# Patient Record
Sex: FEMALE | Race: BLACK OR AFRICAN AMERICAN | Employment: FULL TIME | URBAN - METROPOLITAN AREA
[De-identification: names, ages, dates, MRNs, and addresses within clinical notes are randomized per-mention and may not be internally consistent; named-entity substitution may affect disease eponyms.]

---

## 2017-01-05 ENCOUNTER — APPOINTMENT (OUTPATIENT)
Dept: GENERAL RADIOLOGY | Age: 27
End: 2017-01-05
Attending: PHYSICIAN ASSISTANT
Payer: COMMERCIAL

## 2017-01-05 ENCOUNTER — HOSPITAL ENCOUNTER (EMERGENCY)
Age: 27
Discharge: HOME | End: 2017-01-05
Attending: EMERGENCY MEDICINE
Payer: COMMERCIAL

## 2017-01-05 VITALS
OXYGEN SATURATION: 100 % | SYSTOLIC BLOOD PRESSURE: 129 MMHG | BODY MASS INDEX: 27.94 KG/M2 | RESPIRATION RATE: 15 BRPM | WEIGHT: 178 LBS | HEIGHT: 67 IN | TEMPERATURE: 97.8 F | DIASTOLIC BLOOD PRESSURE: 85 MMHG | HEART RATE: 75 BPM

## 2017-01-05 DIAGNOSIS — R00.2 PALPITATIONS: Primary | ICD-10-CM

## 2017-01-05 LAB
ALBUMIN SERPL BCP-MCNC: 4.3 G/DL (ref 3.4–5)
ALBUMIN/GLOB SERPL: 1.2 {RATIO} (ref 0.8–1.7)
ALP SERPL-CCNC: 82 U/L (ref 45–117)
ALT SERPL-CCNC: 22 U/L (ref 13–56)
ANION GAP BLD CALC-SCNC: 11 MMOL/L (ref 3–18)
AST SERPL W P-5'-P-CCNC: 14 U/L (ref 15–37)
BASOPHILS # BLD AUTO: 0 K/UL (ref 0–0.06)
BASOPHILS # BLD: 0 % (ref 0–2)
BILIRUB SERPL-MCNC: 0.3 MG/DL (ref 0.2–1)
BUN SERPL-MCNC: 9 MG/DL (ref 7–18)
BUN/CREAT SERPL: 12 (ref 12–20)
CALCIUM SERPL-MCNC: 8.9 MG/DL (ref 8.5–10.1)
CHLORIDE SERPL-SCNC: 106 MMOL/L (ref 100–108)
CO2 SERPL-SCNC: 25 MMOL/L (ref 21–32)
CREAT SERPL-MCNC: 0.74 MG/DL (ref 0.6–1.3)
DIFFERENTIAL METHOD BLD: ABNORMAL
EOSINOPHIL # BLD: 0.1 K/UL (ref 0–0.4)
EOSINOPHIL NFR BLD: 1 % (ref 0–5)
ERYTHROCYTE [DISTWIDTH] IN BLOOD BY AUTOMATED COUNT: 14.3 % (ref 11.6–14.5)
GLOBULIN SER CALC-MCNC: 3.5 G/DL (ref 2–4)
GLUCOSE SERPL-MCNC: 87 MG/DL (ref 74–99)
HCG SERPL QL: NEGATIVE
HCG SERPL-ACNC: <1 MIU/ML (ref 1–6)
HCT VFR BLD AUTO: 41.1 % (ref 35–45)
HGB BLD-MCNC: 13.1 G/DL (ref 12–16)
LYMPHOCYTES # BLD AUTO: 43 % (ref 21–52)
LYMPHOCYTES # BLD: 4.4 K/UL (ref 0.9–3.6)
MCH RBC QN AUTO: 25.9 PG (ref 24–34)
MCHC RBC AUTO-ENTMCNC: 31.9 G/DL (ref 31–37)
MCV RBC AUTO: 81.2 FL (ref 74–97)
MONOCYTES # BLD: 0.7 K/UL (ref 0.05–1.2)
MONOCYTES NFR BLD AUTO: 7 % (ref 3–10)
NEUTS SEG # BLD: 5 K/UL (ref 1.8–8)
NEUTS SEG NFR BLD AUTO: 49 % (ref 40–73)
PLATELET # BLD AUTO: 254 K/UL (ref 135–420)
PMV BLD AUTO: 10.5 FL (ref 9.2–11.8)
POTASSIUM SERPL-SCNC: 3.8 MMOL/L (ref 3.5–5.5)
PROT SERPL-MCNC: 7.8 G/DL (ref 6.4–8.2)
RBC # BLD AUTO: 5.06 M/UL (ref 4.2–5.3)
SODIUM SERPL-SCNC: 142 MMOL/L (ref 136–145)
WBC # BLD AUTO: 10.3 K/UL (ref 4.6–13.2)

## 2017-01-05 PROCEDURE — 80053 COMPREHEN METABOLIC PANEL: CPT | Performed by: PHYSICIAN ASSISTANT

## 2017-01-05 PROCEDURE — 85025 COMPLETE CBC W/AUTO DIFF WBC: CPT | Performed by: PHYSICIAN ASSISTANT

## 2017-01-05 PROCEDURE — 84703 CHORIONIC GONADOTROPIN ASSAY: CPT | Performed by: PHYSICIAN ASSISTANT

## 2017-01-05 PROCEDURE — 84702 CHORIONIC GONADOTROPIN TEST: CPT | Performed by: PHYSICIAN ASSISTANT

## 2017-01-05 PROCEDURE — 93005 ELECTROCARDIOGRAM TRACING: CPT

## 2017-01-05 PROCEDURE — 71020 XR CHEST PA LAT: CPT

## 2017-01-05 PROCEDURE — 99284 EMERGENCY DEPT VISIT MOD MDM: CPT

## 2017-01-05 NOTE — Clinical Note
Follow up with your primary care physician. Return to the emergency room with any new or worsening conditions.

## 2017-01-05 NOTE — ED PROVIDER NOTES
HPI Comments: 6:17 PM. Bryant Lechuga is a 27 y.o. female with a history of asthma, GERD, HTN, anemia, and UTI who presents to the ED c/o intermittent palpitations, which started approximately 1.5 weeks ago. The patient reports feeling that her [place] Crutch is racing. \" The patient states that her symptoms keep her up at night. The patient notes that she has been feeling her heart racing \"almost every day. \" The patient states that she has never had these symptoms before. The patient notes that she does not consume a lot of Caffeine. The patient states that she has been taking Prednisone and Doxycyclin for bronchitis for approximately 1 week. The patient states that she is unaware of when she last had her menstrual period due to the Depo-Provera. The patient denies appetite changes, fever, or urinary symptoms. The patient admits to occasional drinking, but denies smoking or drug use. There are no other concerns at this time. The history is provided by the patient. Past Medical History:   Diagnosis Date    Asthma     GERD (gastroesophageal reflux disease)     H/O sinusitis     Hx: UTI (urinary tract infection)     Hypertension        Past Surgical History:   Procedure Laterality Date    Hx gyn  2010         Family History:   Problem Relation Age of Onset    Hypertension Mother     Asthma Mother     Hypertension Maternal Grandmother        Social History     Social History    Marital status: SINGLE     Spouse name: N/A    Number of children: N/A    Years of education: N/A     Occupational History    Not on file.      Social History Main Topics    Smoking status: Never Smoker    Smokeless tobacco: Never Used    Alcohol use Yes      Comment: 1 a day    Drug use: No    Sexual activity: Yes     Partners: Male     Birth control/ protection: Injection     Other Topics Concern    Not on file     Social History Narrative         ALLERGIES: Diflucan [fluconazole] and Penicillins    Review of Systems   Constitutional: Negative for appetite change, chills, fatigue and fever. HENT: Negative for congestion, rhinorrhea and sore throat. Respiratory: Negative for cough and shortness of breath. Cardiovascular: Positive for palpitations (intermittent). Negative for chest pain. Gastrointestinal: Negative for abdominal pain, diarrhea, nausea and vomiting. Genitourinary: Negative for difficulty urinating, dysuria, hematuria and urgency. Musculoskeletal: Negative for myalgias. Skin: Negative for rash and wound. Neurological: Negative for dizziness and headaches. All other systems reviewed and are negative. Vitals:    01/05/17 1741   BP: (!) 135/93   Pulse: 84   Resp: 14   Temp: 97.9 °F (36.6 °C)   SpO2: 100%   Weight: 80.7 kg (178 lb)   Height: 5' 7\" (1.702 m)            Physical Exam   Constitutional: She appears well-developed and well-nourished. Non-toxic appearance. She does not have a sickly appearance. She does not appear ill. No distress. HENT:   Head: Normocephalic and atraumatic. Mouth/Throat: Oropharynx is clear and moist. No oropharyngeal exudate. Eyes: Conjunctivae and EOM are normal. Pupils are equal, round, and reactive to light. No scleral icterus. Neck: Normal range of motion. Neck supple. No hepatojugular reflux and no JVD present. No tracheal deviation present. No thyromegaly present. Cardiovascular: Normal rate, regular rhythm, S1 normal, S2 normal, normal heart sounds, intact distal pulses and normal pulses. Exam reveals no gallop, no S3 and no S4. No murmur heard. Pulses:       Radial pulses are 2+ on the right side, and 2+ on the left side. Dorsalis pedis pulses are 2+ on the right side, and 2+ on the left side. Pulmonary/Chest: Effort normal and breath sounds normal. No respiratory distress. She has no decreased breath sounds. She has no wheezes. She has no rhonchi. She has no rales. Abdominal: Soft.  Normal appearance and bowel sounds are normal. She exhibits no distension and no mass. There is no hepatosplenomegaly. There is no tenderness. There is no rigidity, no rebound, no guarding, no CVA tenderness, no tenderness at McBurney's point and negative Ruff's sign. Musculoskeletal: Normal range of motion. Lymphadenopathy:        Head (right side): No submental, no submandibular, no preauricular and no occipital adenopathy present. Head (left side): No submental, no submandibular, no preauricular and no occipital adenopathy present. She has no cervical adenopathy. Right: No supraclavicular adenopathy present. Left: No supraclavicular adenopathy present. Neurological: She is alert. She has normal strength and normal reflexes. She is not disoriented. No cranial nerve deficit or sensory deficit. Coordination and gait normal. GCS eye subscore is 4. GCS verbal subscore is 5. GCS motor subscore is 6. Skin: Skin is warm, dry and intact. No rash noted. She is not diaphoretic. Psychiatric: She has a normal mood and affect. Her speech is normal and behavior is normal. Judgment and thought content normal. Cognition and memory are normal.   Nursing note and vitals reviewed. MDM  Number of Diagnoses or Management Options  Palpitations:   Diagnosis management comments: Palpitation   Anemia  Infection   Hyperthyroid  Drug induced  Asthma exacerbation         ED Course       Procedures       Labs essentially normal. Pregnancy test is negative. Chest Xray shows no acute process. EKG showed NSR with rate of 83 bpm. With no ST elevations or depression and non specific T wave changes. 7:17 PM 1/5/2017      I have reassessed the patient. Patient is feeling better. Patient was discharged in stable condition. Patient is to return to emergency department if any new or worsening condition.       Scribe Attestation:   Jeni Muhammad am scribing for and in the presence of [place] DO Ramonita January 05, 2017 at 6:27 PM Signed by: Elvia Vidal, 01/05/17, 6:27 PM     Physician Attestation:   I personally performed the services described in this documentation, reviewed and edited the documentation which was dictated to the scribe in my presence, and it accurately records my words and actions.  Gabriela Eaton DO  January 05, 2017 at 7:18 PM

## 2017-01-06 NOTE — TELEPHONE ENCOUNTER
Patient called in and stated that she sent a [employer] request for a prescription refill for the medication Nexium. Patient stated that she has not heard anything back and wanted to know if this could be done today. Please call patient when medication is sent to the pharmacy.

## 2017-01-06 NOTE — TELEPHONE ENCOUNTER
LM for Pt that it was sent on 1/3/16 to her pharmacy but was discontinued by the ED yesterday as not a current medication. Recommend Pt check with her pharmacy.

## 2017-01-06 NOTE — ED NOTES
I have reviewed discharge instructions with the patient. The patient verbalized understanding. Patient armband removed and shredded  Patient acknowledged and signed that all discharge paper documents released were all reviewed and checked under her name. Current Discharge Medication List      CONTINUE these medications which have NOT CHANGED    Details   Cetirizine (ZYRTEC) 10 mg cap Take  by mouth.      medroxyPROGESTERone (DEPO-PROVERA) 150 mg/mL syrg 150 mg by IntraMUSCular route once. montelukast (SINGULAIR) 10 mg tablet TAKE 1 TABLET BY MOUTH DAILY  Qty: 90 Tab, Refills: 1      albuterol (PROVENTIL HFA, VENTOLIN HFA, PROAIR HFA) 90 mcg/actuation inhaler Take 2 Puffs by inhalation every four (4) hours as needed for Wheezing. Qty: 1 Inhaler, Refills: 5      multivitamin (ONE A DAY) tablet Take 1 tablet by mouth daily.

## 2017-01-06 NOTE — ED NOTES
Patient greeted / introduced myself as their primary nurse. Encouraged to voice any concerns, and all questions/concerns addressed. Assessment in progress. Explanation and teaching of all care given, including any pending orders or procedures. Call bell with reach. Awaiting further MD orders at this time. Patient fall risk assessed, with prevention measures in place, to include bed in lowest position with casters locked and rail up, call bell within reach, frequent toileting in progress, lights on, pathway to bathroom free from obstacles. Patient instructed to call for assist OOB at all times. Floor dry. Slip resistant socks offered if needed. Instructed that staff will make hourly rounds to provide reassessments in pain control, concerns, toileting, and any other updates in care. Hand hygiene maintained prior to and after patient/staff interaction.

## 2017-01-08 LAB
ATRIAL RATE: 83 BPM
CALCULATED P AXIS, ECG09: 59 DEGREES
CALCULATED R AXIS, ECG10: 69 DEGREES
CALCULATED T AXIS, ECG11: 37 DEGREES
DIAGNOSIS, 93000: NORMAL
P-R INTERVAL, ECG05: 128 MS
Q-T INTERVAL, ECG07: 382 MS
QRS DURATION, ECG06: 88 MS
QTC CALCULATION (BEZET), ECG08: 448 MS
VENTRICULAR RATE, ECG03: 83 BPM

## 2017-01-10 NOTE — TELEPHONE ENCOUNTER
Josafat faxed prior authorization needed for esomeprazole (Nexium). Pt has [employer] & it requires Pt have failed 4 PPI generic drugs  1.  Omeprazole 40mg                           2. Lansoprazole 30mg                           3. Pantoprazole 20mg, 40mg                           4.  Rabeprazole 20mg                            Other__________

## 2017-01-12 RX ORDER — OMEPRAZOLE 40 MG/1
40 CAPSULE, DELAYED RELEASE ORAL DAILY
Qty: 30 CAP | Refills: 1 | Status: SHIPPED | OUTPATIENT
Start: 2017-01-12 | End: 2021-10-15

## 2017-02-10 ENCOUNTER — HOSPITAL ENCOUNTER (OUTPATIENT)
Dept: ULTRASOUND IMAGING | Age: 27
Discharge: HOME | End: 2017-02-10
Attending: INTERNAL MEDICINE
Payer: COMMERCIAL

## 2017-02-10 DIAGNOSIS — R11.0 NAUSEA: ICD-10-CM

## 2017-02-10 PROCEDURE — 76705 ECHO EXAM OF ABDOMEN: CPT

## 2017-03-06 ENCOUNTER — HOSPITAL ENCOUNTER (OUTPATIENT)
Dept: GENERAL RADIOLOGY | Age: 27
Discharge: HOME | End: 2017-03-06
Payer: COMMERCIAL

## 2017-03-06 DIAGNOSIS — M54.50 CHRONIC LOW BACK PAIN WITHOUT SCIATICA, UNSPECIFIED BACK PAIN LATERALITY: ICD-10-CM

## 2017-03-06 DIAGNOSIS — G89.29 CHRONIC LOW BACK PAIN WITHOUT SCIATICA, UNSPECIFIED BACK PAIN LATERALITY: ICD-10-CM
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
## 2017-03-06 PROCEDURE — 72220 X-RAY EXAM SACRUM TAILBONE: CPT

## 2017-03-06 PROCEDURE — 72100 X-RAY EXAM L-S SPINE 2/3 VWS: CPT

## 2017-03-07 ENCOUNTER — TELEPHONE (OUTPATIENT)
Dept: INTERNAL MEDICINE CLINIC | Age: 27
End: 2017-03-07

## 2017-03-07 NOTE — TELEPHONE ENCOUNTER
Pt called again about her xrays. Aware she will be called when Dr Cheikh Bautista gets a chance to look at them. Reminded Pt she has an appt with Dr Cheikh Bautista tomorrow but Pt states she might cancel it as she might go to [facility] instead.

## 2017-04-17 ENCOUNTER — OFFICE VISIT (OUTPATIENT)
Dept: SURGERY | Age: 27
End: 2017-04-17

## 2017-04-17 VITALS
HEART RATE: 84 BPM | SYSTOLIC BLOOD PRESSURE: 149 MMHG | RESPIRATION RATE: 20 BRPM | WEIGHT: 174 LBS | DIASTOLIC BLOOD PRESSURE: 85 MMHG | HEIGHT: 67 IN | BODY MASS INDEX: 27.31 KG/M2 | TEMPERATURE: 97.8 F

## 2017-04-17 DIAGNOSIS — L05.01 PILONIDAL CYST WITH ABSCESS: Primary | ICD-10-CM

## 2017-04-17 RX ORDER — TERCONAZOLE 80 MG/1
80 SUPPOSITORY VAGINAL
COMMUNITY
Start: 2017-04-14 | End: 2021-10-15

## 2017-04-17 RX ORDER — KETOCONAZOLE 20 MG/ML
SHAMPOO TOPICAL
COMMUNITY
Start: 2017-04-05 | End: 2021-10-15

## 2017-04-17 RX ORDER — MEDROXYPROGESTERONE ACETATE 150 MG/ML
150 INJECTION, SUSPENSION INTRAMUSCULAR
COMMUNITY
End: 2021-10-15

## 2017-04-17 RX ORDER — SULFAMETHOXAZOLE AND TRIMETHOPRIM 800; 160 MG/1; MG/1
TABLET ORAL
COMMUNITY
Start: 2017-04-14 | End: 2017-04-24

## 2017-04-17 RX ORDER — OMEPRAZOLE 40 MG/1
40 CAPSULE, DELAYED RELEASE ORAL
COMMUNITY
End: 2021-10-15

## 2017-04-17 NOTE — MR AVS SNAPSHOT
Visit Information Date & Time Provider Department Dept. Phone Encounter #  
 4/17/2017  9:30 AM Carmelita Mcdonnell [facility] [phone] [id number] Your Appointments 5/10/2017  3:45 PM  
POST OP with Eloise Hill MD  
[address] [facility]) Appt Note: 2 week post op [id number] [facility] [address] [facility] [address]  
  
   
 [address] [facility] 88 710 [facility] 951 Upcoming Health Maintenance Date Due DTaP/Tdap/Td series (1 - Tdap) 9/2/2009 INFLUENZA AGE 9 TO ADULT 8/1/2016 PAP AKA CERVICAL CYTOLOGY 6/30/2017 Allergies as of 4/17/2017  Review Complete On: 4/17/2017 By: Zeenat Mendoza Severity Noted Reaction Type Reactions Diflucan [Fluconazole]  08/06/2012    Hives Penicillins  04/02/2015    Hives Current Immunizations  Never Reviewed Name Date Influenza Vaccine (Quad) PF 9/21/2015  3:05 PM  
  
 Not reviewed this visit Vitals BP Pulse Temp Resp Height(growth percentile) Weight(growth percentile) 149/85 (BP 1 Location: Left arm, BP Patient Position: At rest) 84 97.8 °F (36.6 °C) (Oral) 20 5' 7\" (1.702 m) 174 lb (78.9 kg) BMI OB Status Smoking Status 27.25 kg/m2 Injection Never Smoker BMI and BSA Data Body Mass Index Body Surface Area  
 27.25 kg/m 2 1.93 m 2 Preferred Pharmacy Pharmacy Name Phone [address], [address] [place] [address] 02 [phone] [facility] [phone] Your Updated Medication List  
  
   
This list is accurate as of: 4/17/17 10:23 AM.  Always use your most recent med list.  
  
  
  
  
 albuterol 90 mcg/actuation inhaler Commonly known as:  PROVENTIL HFA, VENTOLIN HFA, PROAIR HFA Take 2 Puffs by inhalation every four (4) hours as needed for Wheezing.  
  
 ketoconazole 2 % shampoo Commonly known as:  NIZORAL Shampoo once, lather, rinse after 5 minutes. Use 2x/week x 8 weeks then repeat every 1-2 weeks as needed. * medroxyPROGESTERone 150 mg/mL Syrg Commonly known as:  DEPO-PROVERA  
150 mg by IntraMUSCular route once. * medroxyPROGESTERone 150 mg/mL injection Commonly known as:  DEPO-PROVERA  
150 mg.  
  
 montelukast 10 mg tablet Commonly known as:  SINGULAIR  
TAKE 1 TABLET BY MOUTH DAILY  
  
 multivitamin tablet Commonly known as:  ONE A DAY Take 1 tablet by mouth daily. * omeprazole 40 mg capsule Commonly known as:  PRILOSEC 40 mg.  
  
 * omeprazole 40 mg capsule Commonly known as:  PRILOSEC Take 1 Cap by mouth daily. terconazole 80 mg vaginal suppository Commonly known as:  TERAZOL 3  
80 mg.  
  
 trimethoprim-sulfamethoxazole 160-800 mg per tablet Commonly known as:  BACTRIM DS, SEPTRA DS Take 1 Tab by Mouth Twice Daily for 10 days. ZyrTEC 10 mg Cap Generic drug:  Cetirizine Take  by mouth. * Notice: This list has 4 medication(s) that are the same as other medications prescribed for you. Read the directions carefully, and ask your doctor or other care provider to review them with you. Patient Instructions If you have any questions or concerns about today's appointment, the verbal and/or written instructions you were given for follow up care, please call our office at [phone]. [facility] - [address], [place] [phone] office [phone] fax Sidney Barthel Sidney Barthel PATIENT PRE AND POST OPERATIVE INSTRUCTIONS [address] Before Surgery Instructions:  
1) You must have someone available to drive you to and from your procedure and stay with you for the first 24 hours. 2) It is very important that you have nothing to eat or drink after midnight the night before your surgery.  This includes chewing gum or sucking on hard candy. Take only heart, blood pressure and cholesterol medications the morning of surgery with only a sip of water. 3) Please stop taking Plavix 10 days prior to your surgery. Stop taking Coumadin 5 days prior to your surgery. Stop taking all Aspirin or Aspirin containing products 7 days prior to your surgery. Stop taking Advil, Motrin, Aleve, and etc. 3 days prior to your surgery. 4) If you take any diabetic medications please consult with your primary care physician on how to take them on the day of your surgery 5) Please stop all Herbal products 2 weeks prior to your surgery. 6) Please arrive at the hospital 1 ½ hours prior to your surgery, unless you have been otherwise instructed. 7) Patients having an operation on their colon will be given a separate instruction sheet on their Bowel Prep. 8) For any pre-operative work up check in at the main entrance to [facility], and then go to Patient Registration. These studies are done on a walk in basis they are open from 7:00am to 5:00pm Monday through Friday. 9) Please wash your surgical site the morning of your surgery with soap and water. 10) If you are of child bearing age you will have pregnancy test done the morning of your surgery as soon as you arrive. After Surgery Instructions: You will need to be seen in the office for a follow-up visit 7-14 days after your surgery. Please call after you have had the procedure to make this appointment. Unless otherwise instructed, you may remove your outer bandage and shower 48 hours after your surgery. If you develop a fever greater than 101, have any significant drainage, bleeding, swelling and/or pus of the wound. Please call our office immediately. Surgery Date and Time: Thursday, April 27, 2017 at 8:30am  
 
Please check in at [facility], enter through the Emergency Room entrance and go up to the second floor.  Please check in by 7:00am the day of your surgery. You may contact Deepika Eaton with any questions at [phone]. Introducing [facility] & HEALTH SERVICES! Dear Angelica Alaniz: 
Thank you for requesting a [employer] account. Our records indicate that you already have an active [employer] account. You can access your account anytime at [email or web address] Did you know that you can access your hospital and ER discharge instructions at any time in [employer]? You can also review all of your test results from your hospital stay or ER visit. Additional Information If you have questions, please visit the Frequently Asked Questions section of the [employer] website at [email or web address] Remember, [employer] is NOT to be used for urgent needs. For medical emergencies, dial 911. Now available from your iPhone and Android! Please provide this summary of care documentation to your next provider. Your primary care clinician is listed as Jeanne Wiggins. If you have any questions after today's visit, please call [phone].

## 2017-04-17 NOTE — PATIENT INSTRUCTIONS
If you have any questions or concerns about today's appointment, the verbal and/or written instructions you were given for follow up care, please call our office at [phone]. [facility] - [address], [address] [place] [address]    [phone] office  [phone] fax      . Rachna Saleh PATIENT PRE AND POST OPERATIVE INSTRUCTIONS     [facility]     Before Surgery Instructions:   1) You must have someone available to drive you to and from your procedure and stay with you for the first 24 hours. 2) It is very important that you have nothing to eat or drink after midnight the night before your surgery. This includes chewing gum or sucking on hard candy. Take only heart, blood pressure and cholesterol medications the morning of surgery with only a sip of water. 3) Please stop taking Plavix 10 days prior to your surgery. Stop taking Coumadin 5 days prior to your surgery. Stop taking all Aspirin or Aspirin containing products 7 days prior to your surgery. Stop taking Advil, Motrin, Aleve, and etc. 3 days prior to your surgery. 4) If you take any diabetic medications please consult with your primary care physician on how to take them on the day of your surgery  5) Please stop all Herbal products 2 weeks prior to your surgery. 6) Please arrive at the hospital 1 ½ hours prior to your surgery, unless you have been otherwise instructed. 7) Patients having an operation on their colon will be given a separate instruction sheet on their Bowel Prep. 8) For any pre-operative work up check in at the main entrance to [address] [place] [address], and then go to Patient Registration. These studies are done on a walk in basis they are open from 7:00am to 5:00pm Monday through Friday. 9) Please wash your surgical site the morning of your surgery with soap and water.   10) If you are of child bearing age you will have pregnancy test done the morning of your surgery as soon as you arrive. After Surgery Instructions: You will need to be seen in the office for a follow-up visit 7-14 days after your surgery. Please call after you have had the procedure to make this appointment. Unless otherwise instructed, you may remove your outer bandage and shower 48 hours after your surgery. If you develop a fever greater than 101, have any significant drainage, bleeding, swelling and/or pus of the wound. Please call our office immediately. Surgery Date and Time: Thursday, April 27, 2017 at 8:30am     Please check in at [facility], enter through the Emergency Room entrance and go up to the second floor. Please check in by 7:00am the day of your surgery. You may contact Gerson Nunez with any questions at [phone].

## 2017-04-17 NOTE — PROGRESS NOTES
Radha Molina is a 27 y.o. female who presents today with   Chief Complaint   Patient presents with    Cyst     Pt presents today c/o pilonidal cyst.     1. Have you been to the ER, urgent care clinic since your last visit? Hospitalized since your last visit? No    2. Have you seen or consulted any other health care providers outside of the [facility] since your last visit? Include any pap smears or colon screening.  No

## 2017-04-17 NOTE — LETTER
NOTIFICATION RETURN TO WORK / SCHOOL 
 
4/17/2017 10:31 AM 
 
Ms. Jessika Marquez [address] [place] [phone] To Whom It May Concern: 
 
Jessika Marquez is currently under the care of Ammon Zambrano. She will return to work/school on: 04/17/2017 without restrictions. If there are questions or concerns please have the patient contact our office. Sincerely, MD Adiel Zhang PSR

## 2017-04-19 NOTE — PROGRESS NOTES
General Surgery Consult    Jong Rose  Admit date: (Not on file)    MRN: [id number]     : 1990     Age: 27 y.o. Attending Physician: Madeline Leyva MD [facility]      History of Present Illness:      Jong Rose is a 27 y.o. female who presented with a history of pilonidal cyst. She had couple infection in the cyst which it seems was drained twice but it did recur. She recently had the cyst drained and she is currently on antibiotics. She said that the pain is better and the drainage has stopped. She denies any fever or chills. Patient Active Problem List    Diagnosis Date Noted    Chronic midline low back pain without sciatica 2016    Breast lump on left side at 1 o'clock position 2016    Allergic rhinitis due to pollen 2016    Routine general medical examination at a health care facility 2015    Gastroesophageal reflux disease without esophagitis 2015     Past Medical History:   Diagnosis Date    Asthma     GERD (gastroesophageal reflux disease)     H/O sinusitis     Hx: UTI (urinary tract infection)     Hypertension       Past Surgical History:   Procedure Laterality Date    HX GYN  2010      Social History   Substance Use Topics    Smoking status: Never Smoker    Smokeless tobacco: Never Used    Alcohol use Yes      Comment: 1 a day      History   Smoking Status    Never Smoker   Smokeless Tobacco    Never Used     Family History   Problem Relation Age of Onset    Hypertension Mother     Asthma Mother     Hypertension Maternal Grandmother       Current Outpatient Prescriptions   Medication Sig    terconazole (TERAZOL 3) 80 mg vaginal suppository 80 mg.    trimethoprim-sulfamethoxazole (BACTRIM DS, SEPTRA DS) 160-800 mg per tablet Take 1 Tab by Mouth Twice Daily for 10 days.  ketoconazole (NIZORAL) 2 % shampoo Shampoo once, lather, rinse after 5 minutes. Use 2x/week x 8 weeks then repeat every 1-2 weeks as needed.     medroxyPROGESTERone (DEPO-PROVERA) 150 mg/mL injection 150 mg.    omeprazole (PRILOSEC) 40 mg capsule 40 mg.    omeprazole (PRILOSEC) 40 mg capsule Take 1 Cap by mouth daily.  Cetirizine (ZYRTEC) 10 mg cap Take  by mouth.  medroxyPROGESTERone (DEPO-PROVERA) 150 mg/mL syrg 150 mg by IntraMUSCular route once.  montelukast (SINGULAIR) 10 mg tablet TAKE 1 TABLET BY MOUTH DAILY    albuterol (PROVENTIL HFA, VENTOLIN HFA, PROAIR HFA) 90 mcg/actuation inhaler Take 2 Puffs by inhalation every four (4) hours as needed for Wheezing.  multivitamin (ONE A DAY) tablet Take 1 tablet by mouth daily. No current facility-administered medications for this visit. Allergies   Allergen Reactions    Diflucan [Fluconazole] Hives    Penicillins Hives          Review of Systems:  Pertinent items are noted in the History of Present Illness. Objective:     Visit Vitals    /85 (BP 1 Location: Left arm, BP Patient Position: At rest)    Pulse 84    Temp 97.8 °F (36.6 °C) (Oral)    Resp 20    Ht 5' 7\" (1.702 m)    Wt 78.9 kg (174 lb)    BMI 27.25 kg/m2       Physical Exam:      General:  in no apparent distress and well developed and well nourished   Eyes:  conjunctivae and sclerae normal, pupils equal, round, reactive to light   Throat & Neck: no erythema or exudates noted and neck supple and symmetrical; no palpable masses   Lungs:   clear to auscultation bilaterally   Heart:  Regular rate and rhythm   Abdomen:   flat, soft, nontender, nondistended, no masses or organomegaly   Buttocks: A 2 cm pilonidal cyst with 2 sinuses. No erythema. No tenderness.             Imaging and Lab Review:     CBC:   Lab Results   Component Value Date/Time    WBC 10.3 01/05/2017 06:25 PM    RBC 5.06 01/05/2017 06:25 PM    HGB 13.1 01/05/2017 06:25 PM    HCT 41.1 01/05/2017 06:25 PM    PLATELET 011 96/45/3946 06:25 PM     BMP:   Lab Results   Component Value Date/Time    Glucose 87 01/05/2017 06:25 PM    Sodium 142 01/05/2017 06:25 PM    Potassium 3.8 01/05/2017 06:25 PM    Chloride 106 01/05/2017 06:25 PM    CO2 25 01/05/2017 06:25 PM    BUN 9 01/05/2017 06:25 PM    Creatinine 0.74 01/05/2017 06:25 PM    Calcium 8.9 01/05/2017 06:25 PM     CMP:  Lab Results   Component Value Date/Time    Glucose 87 01/05/2017 06:25 PM    Sodium 142 01/05/2017 06:25 PM    Potassium 3.8 01/05/2017 06:25 PM    Chloride 106 01/05/2017 06:25 PM    CO2 25 01/05/2017 06:25 PM    BUN 9 01/05/2017 06:25 PM    Creatinine 0.74 01/05/2017 06:25 PM    Calcium 8.9 01/05/2017 06:25 PM    Anion gap 11 01/05/2017 06:25 PM    BUN/Creatinine ratio 12 01/05/2017 06:25 PM    Alk. phosphatase 82 01/05/2017 06:25 PM    Protein, total 7.8 01/05/2017 06:25 PM    Albumin 4.3 01/05/2017 06:25 PM    Globulin 3.5 01/05/2017 06:25 PM    A-G Ratio 1.2 01/05/2017 06:25 PM       No results found for this or any previous visit (from the past 24 hour(s)). images and reports reviewed    Assessment:   Elma Zaragoza is a 27 y.o. female is presenting with a pilonidal cyst that has been infected recently. She will need excision with possible flap closure.     Plan:     Continue with Po antibiotics  Schedule for excision of pilonidal cyst with possible flap closure    Please call me if you have any questions (cell phone: [phone])     Signed By: Louisa Villanueva MD     April 19, 2017

## 2017-11-13 ENCOUNTER — OFFICE (OUTPATIENT)
Dept: URBAN - METROPOLITAN AREA CLINIC 78 | Facility: CLINIC | Age: 27
End: 2017-11-13

## 2017-11-13 VITALS
TEMPERATURE: 97.8 F | WEIGHT: 180 LBS | DIASTOLIC BLOOD PRESSURE: 89 MMHG | HEART RATE: 79 BPM | HEIGHT: 67 IN | SYSTOLIC BLOOD PRESSURE: 136 MMHG

## 2017-11-13 DIAGNOSIS — R10.13 EPIGASTRIC PAIN: ICD-10-CM

## 2017-11-13 PROCEDURE — 99244 OFF/OP CNSLTJ NEW/EST MOD 40: CPT

## 2017-11-15 ENCOUNTER — AMBULATORY SURGICAL CENTER (OUTPATIENT)
Dept: URBAN - METROPOLITAN AREA SURGERY 21 | Facility: SURGERY | Age: 27
End: 2017-11-15

## 2017-11-15 DIAGNOSIS — R10.13 EPIGASTRIC PAIN: ICD-10-CM

## 2017-11-15 PROCEDURE — 43239 EGD BIOPSY SINGLE/MULTIPLE: CPT

## 2017-12-27 ENCOUNTER — OFFICE (OUTPATIENT)
Dept: URBAN - METROPOLITAN AREA CLINIC 78 | Facility: CLINIC | Age: 27
End: 2017-12-27

## 2017-12-27 VITALS
DIASTOLIC BLOOD PRESSURE: 77 MMHG | TEMPERATURE: 97.1 F | SYSTOLIC BLOOD PRESSURE: 128 MMHG | WEIGHT: 182 LBS | HEIGHT: 67 IN | HEART RATE: 77 BPM

## 2017-12-27 DIAGNOSIS — K30 FUNCTIONAL DYSPEPSIA: ICD-10-CM

## 2017-12-27 DIAGNOSIS — K90.0 CELIAC DISEASE: ICD-10-CM

## 2017-12-27 PROCEDURE — 99213 OFFICE O/P EST LOW 20 MIN: CPT
